# Patient Record
Sex: FEMALE | Race: WHITE | URBAN - METROPOLITAN AREA
[De-identification: names, ages, dates, MRNs, and addresses within clinical notes are randomized per-mention and may not be internally consistent; named-entity substitution may affect disease eponyms.]

---

## 2022-05-23 ENCOUNTER — OFFICE VISIT (OUTPATIENT)
Dept: URBAN - METROPOLITAN AREA CLINIC 21 | Facility: CLINIC | Age: 67
End: 2022-05-23
Payer: MEDICARE

## 2022-05-23 DIAGNOSIS — H25.813 COMBINED FORMS OF AGE-RELATED CATARACT, BILATERAL: ICD-10-CM

## 2022-05-23 DIAGNOSIS — H16.223 KERATOCONJUNCTIVITIS SICCA, BILATERAL: Primary | ICD-10-CM

## 2022-05-23 PROCEDURE — 99213 OFFICE O/P EST LOW 20 MIN: CPT | Performed by: OPHTHALMOLOGY

## 2022-05-23 RX ORDER — CYCLOSPORINE 0.5 MG/ML
0.05 % EMULSION OPHTHALMIC
Qty: 180 | Refills: 3 | Status: ACTIVE
Start: 2022-05-23

## 2022-05-23 ASSESSMENT — INTRAOCULAR PRESSURE
OS: 22
OD: 22

## 2022-05-23 NOTE — IMPRESSION/PLAN
Impression: Combined forms of age-related cataract, bilateral: H25.813. Plan: Discussed cataract diagnosis with the patient. No treatment required.  RTC in year for yearly exam

## 2022-05-23 NOTE — IMPRESSION/PLAN
Impression: Keratoconjunctivitis sicca, bilateral: A81.828.  Plan: Continue Restasis q 12 hours in both eyes, continue art tears in between restasis Rx sent to pharmacy

## 2022-08-17 ENCOUNTER — OFFICE VISIT (OUTPATIENT)
Dept: URBAN - METROPOLITAN AREA CLINIC 21 | Facility: CLINIC | Age: 67
End: 2022-08-17
Payer: MEDICARE

## 2022-08-17 DIAGNOSIS — H25.813 COMBINED FORMS OF AGE-RELATED CATARACT, BILATERAL: Primary | ICD-10-CM

## 2022-08-17 DIAGNOSIS — H04.123 TEAR FILM INSUFFICIENCY OF BILATERAL LACRIMAL GLANDS: ICD-10-CM

## 2022-08-17 PROCEDURE — 99212 OFFICE O/P EST SF 10 MIN: CPT | Performed by: OPTOMETRIST

## 2022-08-17 ASSESSMENT — VISUAL ACUITY
OD: 20/30
OS: 20/25

## 2022-08-17 ASSESSMENT — INTRAOCULAR PRESSURE
OS: 20
OD: 20

## 2022-08-17 NOTE — IMPRESSION/PLAN
Impression: Combined forms of age-related cataract, bilateral: H25.813. Plan: Advised patient of condition. Patient symptomatic, surgical intervention recommended. Advised patient of condition. Risks, benefits, and alternatives of cataract surgery discussed.

## 2023-01-27 ENCOUNTER — OFFICE VISIT (OUTPATIENT)
Dept: URBAN - METROPOLITAN AREA CLINIC 21 | Facility: CLINIC | Age: 68
End: 2023-01-27
Payer: MEDICARE

## 2023-01-27 DIAGNOSIS — H52.223 REGULAR ASTIGMATISM, BILATERAL: ICD-10-CM

## 2023-01-27 DIAGNOSIS — H35.373 PUCKERING OF MACULA, BILATERAL: ICD-10-CM

## 2023-01-27 DIAGNOSIS — H04.123 TEAR FILM INSUFFICIENCY OF BILATERAL LACRIMAL GLANDS: ICD-10-CM

## 2023-01-27 DIAGNOSIS — H25.813 COMBINED FORMS OF AGE-RELATED CATARACT, BILATERAL: Primary | ICD-10-CM

## 2023-01-27 PROCEDURE — 92134 CPTRZ OPH DX IMG PST SGM RTA: CPT | Performed by: OPHTHALMOLOGY

## 2023-01-27 PROCEDURE — 92136 OPHTHALMIC BIOMETRY: CPT | Performed by: OPHTHALMOLOGY

## 2023-01-27 PROCEDURE — 99214 OFFICE O/P EST MOD 30 MIN: CPT | Performed by: OPHTHALMOLOGY

## 2023-01-27 ASSESSMENT — INTRAOCULAR PRESSURE
OS: 16
OD: 16

## 2023-01-27 ASSESSMENT — VISUAL ACUITY
OD: 20/30
OS: 20/30

## 2023-01-27 NOTE — IMPRESSION/PLAN
Impression: Combined forms of age-related cataract, bilateral: H25.813. Plan: Discussed cataract surgery diagnosis with patient. Discussed risk and benefits Including infection, retinal detachment, droopy eye lid, swelling, bleeding, loss of vision and double vision. Patient understands may still need glasses for best corrected vision. Patient elects to proceed with surgery OS first, then OD to follow. Hermilo, pentacam, argos, and OCT were completed today. Patient is a candidate for the custom care package with a STD IOL, target near. Patient is aware glasses will be needed for intermediate and distance vision. Patient is a candidate for the forever young package with a panoptix IOL. Patient is aware of the possibility of glare and halos at night post op.

## 2023-01-27 NOTE — IMPRESSION/PLAN
Impression: Tear film insufficiency of bilateral lacrimal glands: H04.123. Plan: Mild, advised patient of condition. Artificial tears 3-4x a day OU.  Continue Restasis as directed

## 2023-03-20 ENCOUNTER — PROCEDURE (OUTPATIENT)
Dept: URBAN - METROPOLITAN AREA SURGERY 8 | Facility: SURGERY | Age: 68
End: 2023-03-20
Payer: MEDICARE

## 2023-03-20 DIAGNOSIS — H52.223 REGULAR ASTIGMATISM, BILATERAL: ICD-10-CM

## 2023-03-20 DIAGNOSIS — H25.813 COMBINED FORMS OF AGE-RELATED CATARACT, BILATERAL: Primary | ICD-10-CM

## 2023-03-20 PROCEDURE — PR1FY PR1FY: CUSTOM | Performed by: OPHTHALMOLOGY

## 2023-03-20 PROCEDURE — 66984 XCAPSL CTRC RMVL W/O ECP: CPT | Performed by: OPHTHALMOLOGY

## 2023-03-21 ENCOUNTER — POST-OPERATIVE VISIT (OUTPATIENT)
Dept: URBAN - METROPOLITAN AREA CLINIC 21 | Facility: CLINIC | Age: 68
End: 2023-03-21
Payer: MEDICARE

## 2023-03-21 DIAGNOSIS — Z48.810 ENCOUNTER FOR SURGICAL AFTERCARE FOLLOWING SURGERY ON A SENSE ORGAN: Primary | ICD-10-CM

## 2023-03-21 ASSESSMENT — INTRAOCULAR PRESSURE: OS: 16

## 2023-03-27 ENCOUNTER — POST-OPERATIVE VISIT (OUTPATIENT)
Dept: URBAN - METROPOLITAN AREA CLINIC 21 | Facility: CLINIC | Age: 68
End: 2023-03-27
Payer: MEDICARE

## 2023-03-27 DIAGNOSIS — Z48.810 ENCOUNTER FOR SURGICAL AFTERCARE FOLLOWING SURGERY ON A SENSE ORGAN: ICD-10-CM

## 2023-03-27 DIAGNOSIS — H25.811 COMBINED FORMS OF AGE-RELATED CATARACT, RIGHT EYE: Primary | ICD-10-CM

## 2023-03-27 DIAGNOSIS — H52.221 REGULAR ASTIGMATISM, RIGHT EYE: ICD-10-CM

## 2023-03-27 RX ORDER — PREDNISOLONE/MOXIFLO/NEPAFENAC 1-0.5-0.1%
SUSPENSION, DROPS(FINAL DOSAGE FORM)(ML) OPHTHALMIC (EYE)
Qty: 30 | Refills: 0 | Status: ACTIVE
Start: 2023-03-27

## 2023-03-27 ASSESSMENT — INTRAOCULAR PRESSURE: OS: 12

## 2023-03-27 ASSESSMENT — VISUAL ACUITY: OS: 20/20

## 2023-03-27 NOTE — IMPRESSION/PLAN
Impression: Combined forms of age-related cataract, right eye: H25.811. Plan: Discussed cataract surgery diagnosis with patient. Discussed risk and benefits Including infection, retinal detachment, droopy eye lid, swelling, bleeding, loss of vision and double vision. Patient understands may still need glasses for best corrected vision. Patient elects to proceed with surgery  OD Patient is a candidate for the custom care package with a STD IOL, target near. Patient is aware glasses will be needed for intermediate and distance vision. Patient is a candidate for the forever young package with a panoptix IOL. Patient is aware of the possibility of glare and halos at night post op.

## 2023-03-27 NOTE — IMPRESSION/PLAN
Impression: S/P Cataract Extraction by phacoemulsification with IOL placement; Femto (LenSx); ORA OS - 7 Days. Encounter for surgical aftercare following surgery on a sense organ  Z48.810. Excellent post op course   Post operative instructions reviewed - Condition is improving - Plan: --Continue Prednisolone acetate 1% Post operative instructions reviewed - Condition is improving, patient elects to proceed with 2nd eye.

## 2023-04-03 ENCOUNTER — PROCEDURE (OUTPATIENT)
Dept: URBAN - METROPOLITAN AREA SURGERY 8 | Facility: SURGERY | Age: 68
End: 2023-04-03
Payer: MEDICARE

## 2023-04-03 DIAGNOSIS — H25.811 COMBINED FORMS OF AGE-RELATED CATARACT, RIGHT EYE: Primary | ICD-10-CM

## 2023-04-03 DIAGNOSIS — H52.221 REGULAR ASTIGMATISM, RIGHT EYE: ICD-10-CM

## 2023-04-03 PROCEDURE — PR1FY PR1FY: CUSTOM | Performed by: OPHTHALMOLOGY

## 2023-04-03 PROCEDURE — 66984 XCAPSL CTRC RMVL W/O ECP: CPT | Performed by: OPHTHALMOLOGY

## 2023-04-04 ENCOUNTER — POST-OPERATIVE VISIT (OUTPATIENT)
Dept: URBAN - METROPOLITAN AREA CLINIC 21 | Facility: CLINIC | Age: 68
End: 2023-04-04
Payer: MEDICARE

## 2023-04-04 DIAGNOSIS — Z96.1 PRESENCE OF INTRAOCULAR LENS: Primary | ICD-10-CM

## 2023-04-04 ASSESSMENT — INTRAOCULAR PRESSURE: OD: 11

## 2023-04-04 NOTE — IMPRESSION/PLAN
Impression: S/P Cataract Extraction by phacoemulsification with IOL placement; Femto (LenSx); ORA OD - 1 Day. Presence of intraocular lens  Z96.1. Plan: The surgical eye(s) is improving well. Continue to follow current drop plan and post operative instructions. Recommend artificial tears throughout post operative period. RTC for scheduled follow up.

## 2023-04-10 ENCOUNTER — POST-OPERATIVE VISIT (OUTPATIENT)
Dept: URBAN - METROPOLITAN AREA CLINIC 21 | Facility: CLINIC | Age: 68
End: 2023-04-10
Payer: MEDICARE

## 2023-04-10 DIAGNOSIS — Z96.1 PRESENCE OF INTRAOCULAR LENS: Primary | ICD-10-CM

## 2023-04-10 NOTE — IMPRESSION/PLAN
Impression: S/P Cataract Extraction by phacoemulsification with IOL placement; Femto (LenSx); ORA OD - 7 Days. Presence of intraocular lens  Z96.1. Plan: Patient healing well, continue post-op drops as directed. Call or return sooner if any changes.   Return in 3 weeks for final post-op exam.

## 2023-08-18 ENCOUNTER — OFFICE VISIT (OUTPATIENT)
Dept: URBAN - METROPOLITAN AREA CLINIC 21 | Facility: CLINIC | Age: 68
End: 2023-08-18
Payer: MEDICARE

## 2023-08-18 DIAGNOSIS — Z96.1 PRESENCE OF INTRAOCULAR LENS: ICD-10-CM

## 2023-08-18 DIAGNOSIS — H26.493 OTHER SECONDARY CATARACT, BILATERAL: Primary | ICD-10-CM

## 2023-08-18 PROCEDURE — 99214 OFFICE O/P EST MOD 30 MIN: CPT | Performed by: OPHTHALMOLOGY

## 2023-08-18 ASSESSMENT — VISUAL ACUITY
OD: 20/30
OS: 20/30

## 2023-08-18 ASSESSMENT — INTRAOCULAR PRESSURE
OS: 14
OD: 14

## 2023-09-13 ENCOUNTER — LASER (OUTPATIENT)
Dept: URBAN - METROPOLITAN AREA SURGERY 8 | Facility: SURGERY | Age: 68
End: 2023-09-13
Payer: MEDICARE

## 2024-01-15 ENCOUNTER — OFFICE VISIT (OUTPATIENT)
Dept: URBAN - METROPOLITAN AREA CLINIC 21 | Facility: CLINIC | Age: 69
End: 2024-01-15
Payer: MEDICARE

## 2024-01-15 DIAGNOSIS — H16.223 KERATOCONJUNCTIVITIS SICCA, BILATERAL: Primary | ICD-10-CM

## 2024-01-15 DIAGNOSIS — Z96.1 PRESENCE OF INTRAOCULAR LENS: ICD-10-CM

## 2024-01-15 PROCEDURE — 99213 OFFICE O/P EST LOW 20 MIN: CPT | Performed by: OPHTHALMOLOGY

## 2024-01-15 ASSESSMENT — INTRAOCULAR PRESSURE
OD: 15
OS: 15

## 2024-01-15 ASSESSMENT — VISUAL ACUITY
OD: 20/20
OS: 20/20

## 2025-07-17 ENCOUNTER — OFFICE VISIT (OUTPATIENT)
Dept: URBAN - METROPOLITAN AREA CLINIC 33 | Facility: CLINIC | Age: 70
End: 2025-07-17
Payer: MEDICARE

## 2025-07-17 DIAGNOSIS — H04.123 TEAR FILM INSUFFICIENCY OF BILATERAL LACRIMAL GLANDS: ICD-10-CM

## 2025-07-17 DIAGNOSIS — H35.373 PUCKERING OF MACULA, BILATERAL: Primary | ICD-10-CM

## 2025-07-17 DIAGNOSIS — Z96.1 PRESENCE OF INTRAOCULAR LENS: ICD-10-CM

## 2025-07-17 PROCEDURE — 92004 COMPRE OPH EXAM NEW PT 1/>: CPT

## 2025-07-17 RX ORDER — CYCLOSPORINE 0.5 MG/ML
0.05 % EMULSION OPHTHALMIC
Qty: 180 | Refills: 3 | Status: ACTIVE
Start: 2025-07-17

## 2025-07-17 ASSESSMENT — KERATOMETRY
OS: 43.88
OD: 43.88

## 2025-07-17 ASSESSMENT — INTRAOCULAR PRESSURE
OD: 12
OS: 13